# Patient Record
Sex: MALE | Race: WHITE | NOT HISPANIC OR LATINO | Employment: FULL TIME | URBAN - NONMETROPOLITAN AREA
[De-identification: names, ages, dates, MRNs, and addresses within clinical notes are randomized per-mention and may not be internally consistent; named-entity substitution may affect disease eponyms.]

---
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
## 2020-01-17 ENCOUNTER — TELEPHONE (OUTPATIENT)
Dept: PEDIATRICS | Facility: CLINIC | Age: 20
End: 2020-01-17

## 2020-01-17 RX ORDER — CEFPROZIL 500 MG/1
500 TABLET, FILM COATED ORAL 2 TIMES DAILY
Qty: 20 TABLET | Refills: 0 | Status: SHIPPED | OUTPATIENT
Start: 2020-01-17 | End: 2020-01-27

## 2020-11-20 ENCOUNTER — OFFICE VISIT (OUTPATIENT)
Dept: CARDIOLOGY | Facility: CLINIC | Age: 20
End: 2020-11-20

## 2020-11-20 VITALS
DIASTOLIC BLOOD PRESSURE: 78 MMHG | BODY MASS INDEX: 24.13 KG/M2 | OXYGEN SATURATION: 99 % | WEIGHT: 188 LBS | HEIGHT: 74 IN | HEART RATE: 58 BPM | SYSTOLIC BLOOD PRESSURE: 130 MMHG

## 2020-11-20 DIAGNOSIS — R06.09 DOE (DYSPNEA ON EXERTION): ICD-10-CM

## 2020-11-20 DIAGNOSIS — R00.2 PALPITATIONS: ICD-10-CM

## 2020-11-20 DIAGNOSIS — R00.1 BRADYCARDIA, SINUS: ICD-10-CM

## 2020-11-20 DIAGNOSIS — I47.1 PAROXYSMAL SVT (SUPRAVENTRICULAR TACHYCARDIA) (HCC): Primary | ICD-10-CM

## 2020-11-20 PROBLEM — I47.10 PAROXYSMAL SVT (SUPRAVENTRICULAR TACHYCARDIA): Status: ACTIVE | Noted: 2020-11-20

## 2020-11-20 PROCEDURE — 93000 ELECTROCARDIOGRAM COMPLETE: CPT | Performed by: INTERNAL MEDICINE

## 2020-11-20 PROCEDURE — 99204 OFFICE O/P NEW MOD 45 MIN: CPT | Performed by: INTERNAL MEDICINE

## 2020-11-20 NOTE — PROGRESS NOTES
Herb Kellogg  [id number]  2000  20 y.o.  male    Referring Provider: Curry Tsang MD    Reason for  Visit:  Initial visit for SVT  Diagnosed at age 4 years and started on beta blocker therapy       Subjective    Overall feeling well   No chest pain or shortness of breath when he is on  beta blocker therapy     No palpitations  No significant pedal edema    Compliant with medications and dietary advice  Good effort tolerance    No presyncope or syncope  Compliant with medications    Tolerating current medications well with no untoward side effects   Compliant with prescribed medication regimen. Tries to adhere to cardiac diet.    Good effort tolerance with no cardiovascular limitations and at the patient's baseline   If he does not take Atenolol  he gets dizzy, short of breath  and gets chest pain        History of present illness:  Herb Kellogg is a 20 y.o. yo male with history of SVT  who presents today for   Chief Complaint   Patient presents with   • Rapid Heart Rate     NEW PT    • Palpitations   .    History  Past Medical History:   Diagnosis Date   • SVT (supraventricular tachycardia) (CMS/HCC)    ,   Past Surgical History:   Procedure Laterality Date   • HERNIA REPAIR  2002   • TONSILLECTOMY  2004   • TYMPANOSTOMY TUBE PLACEMENT  2002   ,   Family History   Problem Relation Age of Onset   • Heart disease Other    ,   Social History     Tobacco Use   • Smoking status: Never Smoker   • Smokeless tobacco: Never Used   Substance Use Topics   • Alcohol use: No   • Drug use: Never   ,     Medications  Current Outpatient Medications   Medication Sig Dispense Refill   • atenolol (TENORMIN) 25 MG tablet Take 25 mg by mouth Daily.       No current facility-administered medications for this visit.        Allergies:  Patient has no known allergies.    Review of Systems  Review of Systems   Constitution: Negative.   HENT: Negative.    Eyes: Negative.    Cardiovascular: Negative for chest pain, claudication,  "cyanosis, dyspnea on exertion, irregular heartbeat, leg swelling, near-syncope, orthopnea, palpitations, paroxysmal nocturnal dyspnea and syncope.   Respiratory: Negative.    Endocrine: Negative.    Hematologic/Lymphatic: Negative.    Skin: Negative.    Gastrointestinal: Negative for anorexia.   Genitourinary: Negative.    Neurological: Negative.    Psychiatric/Behavioral: Negative.        Objective     Physical Exam:  /78   Pulse 58   Ht 188 cm (74\")   Wt 85.3 kg (188 lb)   SpO2 99%   BMI 24.14 kg/m²     Physical Exam  Constitutional:       Appearance: He is well-developed.   HENT:      Head: Normocephalic.   Neck:      Musculoskeletal: No edema.      Vascular: Normal carotid pulses. No carotid bruit or JVD.      Trachea: No tracheal tenderness or tracheal deviation.   Cardiovascular:      Rate and Rhythm: Regular rhythm.      Pulses: Normal pulses.      Heart sounds: Normal heart sounds.   Pulmonary:      Effort: Pulmonary effort is normal.      Breath sounds: No stridor.   Abdominal:      General: There is no distension.      Palpations: Abdomen is soft.      Tenderness: There is no abdominal tenderness.   Skin:     General: Skin is warm.   Neurological:      Mental Status: He is alert.      Cranial Nerves: No cranial nerve deficit.      Sensory: No sensory deficit.   Psychiatric:         Speech: Speech normal.         Behavior: Behavior normal.         Results Review:     ____________________________________________________________________________________________________________________________________________  Health maintenance and recommendations    Low salt/ HTN/ Heart healthy carbohydrate restricted cardiac diet   The patient is advised to reduce or avoid caffeine or other cardiac stimulants.   Minimize or avoid  NSAID-type medications      Monitor for any signs of bleeding including red or dark stools. Fall precautions.   Advised staying uptodate with immunizations per established standard " guidelines.    Offered to give patient  a copy of my notes     Questions were encouraged, asked and answered to the patient's  understanding and satisfaction. Questions if any regarding current medications and side effects, need for refills and importance of compliance to medications stressed.    Reviewed available prior notes, consults, prior visits, laboratory findings, radiology and cardiology relevant reports. Updated chart as applicable. I have reviewed the patient's medical history in detail and updated the computerized patient record as relevant.      Updated patient regarding any new or relevant abnormalities on review of records or any new findings on physical exam. Mentioned to patient about purpose of visit and desirable health short and long term goals and objectives.    Primary to monitor CBC CMP Lipid panel and TSH as applicable    ___________________________________________________________________________________________________________________________________________     ECG 12 Lead    Date/Time: 11/20/2020 12:57 PM  Performed by: Murray Rodriguez MD  Authorized by: Murray Rodriguez MD   Comparison: not compared with previous ECG   Rhythm: sinus bradycardia  Rate: bradycardic  Conduction: conduction normal  ST Segments: ST segments normal  T Waves: T waves normal  QRS axis: right  Other: no other findings    Clinical impression: abnormal EKG            Assessment/Plan   Diagnoses and all orders for this visit:    1. Paroxysmal SVT (supraventricular tachycardia) (CMS/HCC) (Primary)  -     Holter Monitor - 72 Hour Up To 21 Days; Future  -     Adult Transthoracic Echo Complete W/ Cont if Necessary Per Protocol; Future    2. Bradycardia, sinus    3. Palpitations    4. ROMAN (dyspnea on exertion)    Other orders  -     ECG 12 Lead          Plan    Orders Placed This Encounter   Procedures   • Holter Monitor - 72 Hour Up To 21 Days     3 days     Standing Status:   Future     Standing Expiration Date:   11/20/2021      Order Specific Question:   Reason for exam?     Answer:   Palpitations   • ECG 12 Lead     This order was created via procedure documentation   • Adult Transthoracic Echo Complete W/ Cont if Necessary Per Protocol     Myocardial strain to be performed during echocardiogram as long as technically feasible     Standing Status:   Future     Standing Expiration Date:   11/20/2021     Order Specific Question:   Reason for exam?     Answer:   Arrhythmia      The current medical regimen is effective;  continue present plan and medications.           Return in about 3 months (around 2/20/2021).

## 2021-01-13 ENCOUNTER — TELEPHONE (OUTPATIENT)
Dept: CARDIOLOGY | Facility: CLINIC | Age: 21
End: 2021-01-13

## 2021-01-13 NOTE — TELEPHONE ENCOUNTER
Pino with [employer] called needing to speak with you regarding this patient's Holter.  He can be reached directly at [phone].  Thank you!

## 2021-02-05 RX ORDER — ATENOLOL 25 MG/1
25 TABLET ORAL DAILY
Qty: 90 TABLET | Refills: 3 | Status: SHIPPED | OUTPATIENT
Start: 2021-02-05 | End: 2021-03-04 | Stop reason: SDUPTHER

## 2021-03-04 ENCOUNTER — OFFICE VISIT (OUTPATIENT)
Dept: CARDIOLOGY | Facility: CLINIC | Age: 21
End: 2021-03-04

## 2021-03-04 VITALS
BODY MASS INDEX: 23.74 KG/M2 | SYSTOLIC BLOOD PRESSURE: 102 MMHG | HEART RATE: 72 BPM | DIASTOLIC BLOOD PRESSURE: 72 MMHG | OXYGEN SATURATION: 98 % | WEIGHT: 185 LBS | HEIGHT: 74 IN

## 2021-03-04 DIAGNOSIS — I47.1 PAROXYSMAL SVT (SUPRAVENTRICULAR TACHYCARDIA) (HCC): ICD-10-CM

## 2021-03-04 DIAGNOSIS — R06.09 DOE (DYSPNEA ON EXERTION): Primary | ICD-10-CM

## 2021-03-04 DIAGNOSIS — R00.2 PALPITATIONS: ICD-10-CM

## 2021-03-04 DIAGNOSIS — R00.1 BRADYCARDIA, SINUS: ICD-10-CM

## 2021-03-04 PROCEDURE — 93000 ELECTROCARDIOGRAM COMPLETE: CPT | Performed by: INTERNAL MEDICINE

## 2021-03-04 PROCEDURE — 99213 OFFICE O/P EST LOW 20 MIN: CPT | Performed by: INTERNAL MEDICINE

## 2021-03-04 RX ORDER — ATENOLOL 25 MG/1
25 TABLET ORAL DAILY
Qty: 90 TABLET | Refills: 3 | Status: SHIPPED | OUTPATIENT
Start: 2021-03-04 | End: 2022-03-07 | Stop reason: SDUPTHER

## 2021-03-04 NOTE — PROGRESS NOTES
Herb Kellogg  [id number]  2000  20 y.o.  male    Referring Provider: Curry Tsang MD    Reason for  Visit:      Here for routine follow up   Initial visit for SVT  Diagnosed at age 4 years and started on beta blocker therapy   Cardiac workup test results as below : 14-day outpatient cardiac telemetry   Insurance for reason unclear did not approve Echo     Subjective      Overall feels the same   No new events or complaints since last visit   Overall the patient feels no major change from baseline symptoms   Similar symptoms as during last visit     Overall feeling well   No chest pain or shortness of breath when he is on  beta blocker therapy     No palpitations  No significant pedal edema    Compliant with medications and dietary advice  Good effort tolerance    No presyncope or syncope  Compliant with medications    Tolerating current medications well with no untoward side effects   Compliant with prescribed medication regimen. Tries to adhere to cardiac diet.  If he does not take Atenolol  he gets dizzy, short of breath  and gets chest pain     Excellent effort tolerance with no cardiovascular limitations and at the patient's baseline        History of present illness:  Herb Kellogg is a 20 y.o. yo male with history of SVT  who presents today for   Chief Complaint   Patient presents with   • Rapid Heart Rate     3 month follow up    .    History  Past Medical History:   Diagnosis Date   • SVT (supraventricular tachycardia) (CMS/HCC)    ,   Past Surgical History:   Procedure Laterality Date   • HERNIA REPAIR  2002   • TONSILLECTOMY  2004   • TYMPANOSTOMY TUBE PLACEMENT  2002   ,   Family History   Problem Relation Age of Onset   • Heart disease Other    ,   Social History     Tobacco Use   • Smoking status: Never Smoker   • Smokeless tobacco: Never Used   Substance Use Topics   • Alcohol use: No   • Drug use: Never   ,     Medications  Current Outpatient Medications   Medication Sig Dispense Refill  "  • atenolol (TENORMIN) 25 MG tablet Take 1 tablet by mouth Daily. 90 tablet 3     No current facility-administered medications for this visit.        Allergies:  Patient has no known allergies.    Review of Systems  Review of Systems   Constitution: Negative.   HENT: Negative.    Eyes: Negative.    Cardiovascular: Negative for chest pain, claudication, cyanosis, dyspnea on exertion, irregular heartbeat, leg swelling, near-syncope, orthopnea, palpitations, paroxysmal nocturnal dyspnea and syncope.   Respiratory: Negative.    Endocrine: Negative.    Hematologic/Lymphatic: Negative.    Skin: Negative.    Gastrointestinal: Negative for anorexia.   Genitourinary: Negative.    Neurological: Negative.    Psychiatric/Behavioral: Negative.        Objective     Physical Exam:  /72   Pulse 72   Ht 188 cm (74\")   Wt 83.9 kg (185 lb)   SpO2 98%   BMI 23.75 kg/m²     Physical Exam  Constitutional:       Appearance: He is well-developed.   HENT:      Head: Normocephalic.   Neck:      Musculoskeletal: No edema.      Vascular: Normal carotid pulses. No carotid bruit or JVD.      Trachea: No tracheal tenderness or tracheal deviation.   Cardiovascular:      Rate and Rhythm: Regular rhythm.      Pulses: Normal pulses.      Heart sounds: Normal heart sounds.   Pulmonary:      Effort: Pulmonary effort is normal.      Breath sounds: No stridor.   Abdominal:      General: There is no distension.      Palpations: Abdomen is soft.      Tenderness: There is no abdominal tenderness.   Skin:     General: Skin is warm.   Neurological:      Mental Status: He is alert.      Cranial Nerves: No cranial nerve deficit.      Sensory: No sensory deficit.   Psychiatric:         Speech: Speech normal.         Behavior: Behavior normal.         Results Review:      [employer]  Holter Monitor 72Hr-21Day (0296T/0298T)  Order# [id number]  Reading physician: Murray Rodriguez MD Ordering physician: Murray Rodriguez MD Study date: 11/20/20   Patient " Information    Patient Name   Herb Kellogg MRN   [id number] Sex   Male  (Age)   2000 (20 y.o.)   Interpretation Summary       · Average HR: 69. Min HR: 25. Max HR: 135     · Entire report was reviewed.  Monitoring in days: ~3   · The predominant rhythm noted during the testing period was sinus rhythm.     · Rare supraventricular ectopics with an APC burden of: < 1%    · Rare premature ventricular contractions with a PVC burden of: < 1%     · No correlated arrhythmia  · No significant pauses                  Conclusion:      Baseline rhythm is sinus.  No significant ectopy   Very transient Mobitz type I AV block with lowest heart rate recorded of 24 bpm this occurred at 3:23 AM presumably during sleep  This was over only over 2 beats  While in sinus rhythm lowest heart rate of 44 bpm at 12:22 AM consisting of marked sinus bradycardia likely once again during sleep  No daytime bradycardia, pauses or any arrhythmia noted.                 ____________________________________________________________________________________________________________________________________________  Health maintenance and recommendations    Low salt/ HTN/ Heart healthy carbohydrate restricted cardiac diet   The patient is advised to reduce or avoid caffeine or other cardiac stimulants.   Minimize or avoid  NSAID-type medications      Monitor for any signs of bleeding including red or dark stools. Fall precautions.   Advised staying uptodate with immunizations per established standard guidelines.    Offered to give patient  a copy of my notes     Questions were encouraged, asked and answered to the patient's  understanding and satisfaction. Questions if any regarding current medications and side effects, need for refills and importance of compliance to medications stressed.    Reviewed available prior notes, consults, prior visits, laboratory findings, radiology and cardiology relevant reports. Updated chart as applicable. I have  reviewed the patient's medical history in detail and updated the computerized patient record as relevant.      Updated patient regarding any new or relevant abnormalities on review of records or any new findings on physical exam. Mentioned to patient about purpose of visit and desirable health short and long term goals and objectives.    Primary to monitor CBC CMP Lipid panel and TSH as applicable    ___________________________________________________________________________________________________________________________________________     ECG 12 Lead    Date/Time: 3/4/2021 9:17 AM  Performed by: Murray Rodriguez MD  Authorized by: Murray Rodriguez MD   Comparison: compared with previous ECG from 11/20/2020  Similar to previous ECG  Comparison to previous ECG: Repolarization changes  Rhythm: sinus rhythm  Rate: normal  Conduction: conduction normal  ST Segments: ST segments normal  T Waves: T waves normal  QRS axis: normal  Other: no other findings    Clinical impression: normal ECG            Assessment/Plan   Diagnoses and all orders for this visit:    1. ROMAN (dyspnea on exertion) (Primary)    2. Paroxysmal SVT (supraventricular tachycardia) (CMS/HCC)    3. Bradycardia, sinus    4. Palpitations    Other orders  -     ECG 12 Lead  -     atenolol (TENORMIN) 25 MG tablet; Take 1 tablet by mouth Daily.  Dispense: 90 tablet; Refill: 3          Plan      Patient expressed understanding  Encouraged and answered all questions   Discussed with the patient and all questioned fully answered. He will call me if any problems arise.   Discussed results of prior testing with patient: 14-day outpatient cardiac telemetry  electrocardiogram from today      Avoid cardiac stimulants such as pseudoephedrine, caffeine,chocolates, nicotine etc   Check BP and heart rates twice daily at least 3x / week, week a month  at home and bring a recording for me to review next visit  If BP >130/85 or < 100/60 persistently over 3 reading 30 mins apart  call sooner        Orders Placed This Encounter   Procedures   • ECG 12 Lead     This order was created via procedure documentation      The current medical regimen is effective;  continue present plan and medications.   Requested Prescriptions     Signed Prescriptions Disp Refills   • atenolol (TENORMIN) 25 MG tablet 90 tablet 3     Sig: Take 1 tablet by mouth Daily.      Patient undecided regarding the Covid vaccine   Urged to consider vaccination further   I can provide more input if required   Recommend further discussion with primary care provider     Follow up with [employer] to address interim issues           Return in about 1 year (around 3/4/2022).

## 2022-03-03 PROBLEM — I47.10 PAROXYSMAL SVT (SUPRAVENTRICULAR TACHYCARDIA): Chronic | Status: ACTIVE | Noted: 2020-11-20

## 2022-03-03 PROBLEM — T50.905A BRADYCARDIA, DRUG INDUCED: Chronic | Status: ACTIVE | Noted: 2020-11-20

## 2022-03-03 PROBLEM — R00.1 BRADYCARDIA, DRUG INDUCED: Chronic | Status: ACTIVE | Noted: 2020-11-20

## 2022-03-03 PROBLEM — I47.1 PAROXYSMAL SVT (SUPRAVENTRICULAR TACHYCARDIA) (HCC): Chronic | Status: ACTIVE | Noted: 2020-11-20

## 2022-03-03 PROBLEM — T50.905A BRADYCARDIA, DRUG INDUCED: Status: ACTIVE | Noted: 2020-11-20

## 2022-03-04 NOTE — PROGRESS NOTES
"Chief Complaint  Rapid Heart Rate (1yr F/U. Has been well. No chest pain, palpitations, SOA, or edema. No feelings of fast HR's)    Subjective          Herb Kellogg presents to [facility] for routine follow-up.  He has paroxysmal supraventricular tachycardia diagnosed at age 4 at which time he was started on beta-blocker therapy.  He has drug-induced bradycardia, but denies any symptoms related to this.  He reports that when he does not take atenolol he has palpitations, chest pain, shortness of breath and dizziness.  However, he denies any of these symptoms when taking atenolol.  Patient denies syncope, orthopnea, PND, edema or decreased stamina.  Patient denies any signs of bleeding.    Palpitations   This is a chronic problem. The current episode started more than 1 year ago. The problem occurs rarely. The problem has been unchanged. Pertinent negatives include no anxiety, chest fullness, chest pain, coughing, diaphoresis, dizziness, fever, irregular heartbeat, malaise/fatigue, nausea, near-syncope, numbness, shortness of breath, syncope, vomiting or weakness. He has tried beta blockers for the symptoms. The treatment provided significant relief. Risk factors include being male.       Objective   Vital Signs:   /86   Pulse 50   Ht 188 cm (74\")   Wt 88.9 kg (196 lb)   BMI 25.16 kg/m²     Vitals and nursing note reviewed.   Constitutional:       General: Awake.      Appearance: Normal and healthy appearance. Well-developed, normal weight and not in distress.   Eyes:      General: Lids are normal.      Conjunctiva/sclera: Conjunctivae normal.      Pupils: Pupils are equal, round, and reactive to light.   HENT:      Head: Normocephalic and atraumatic.      Nose: Nose normal.   Neck:      Vascular: No JVR. JVD normal.   Pulmonary:      Effort: Pulmonary effort is normal.      Breath sounds: Normal breath sounds. No wheezing. No rhonchi. No rales.   Chest:      Chest wall: Not " tender to palpatation.   Cardiovascular:      PMI at left midclavicular line. Bradycardia present. Regular rhythm. Normal S1. Normal S2.      Murmurs: There is no murmur.      No gallop. No click. No rub.   Pulses:     Intact distal pulses.   Edema:     Peripheral edema absent.   Abdominal:      General: Bowel sounds are normal.      Palpations: Abdomen is soft.      Tenderness: There is no abdominal tenderness.   Musculoskeletal: Normal range of motion.         General: No tenderness.      Cervical back: Normal range of motion. Skin:     General: Skin is warm and dry.   Neurological:      General: No focal deficit present.      Mental Status: Alert, oriented to person, place, and time and oriented to person, place and time.   Psychiatric:         Attention and Perception: Attention and perception normal.         Mood and Affect: Mood and affect normal.         Speech: Speech normal.         Behavior: Behavior normal. Behavior is cooperative.         Thought Content: Thought content normal.         Cognition and Memory: Cognition and memory normal.         Judgment: Judgment normal.        Result Review :   The following data was reviewed by: ELEAZAR Pena on 03/04/2022:      Data reviewed: Cardiology studies holter monitor 12/11/20     ECG 12 Lead    Date/Time: 3/7/2022 9:48 AM  Performed by: Destiney Mohr APRN  Authorized by: Destiney Mohr APRN   Comparison: compared with previous ECG from 3/4/2021  Rhythm: sinus bradycardia and sinus arrhythmia  Rate: bradycardic  BPM: 50  Q waves: III and aVF      Clinical impression: abnormal EKG              Assessment and Plan    Diagnoses and all orders for this visit:    1. Paroxysmal SVT (supraventricular tachycardia) (HCC) (Primary)-asymptomatic when taking atenolol.  Continue atenolol.    2. Bradycardia, drug induced-asymptomatic.  Continue atenolol.    3. Abnormal EKG- new inferior q waves with borderline LVH and sinus arrhythmia. Check 2d echo.          Follow Up   Return in about 1 year (around 3/7/2023) for Next scheduled follow up with Dr. Rodriguez.  Patient was given instructions and counseling regarding his condition or for health maintenance advice. Please see specific information pulled into the AVS if appropriate.

## 2022-03-07 ENCOUNTER — OFFICE VISIT (OUTPATIENT)
Dept: CARDIOLOGY | Facility: CLINIC | Age: 22
End: 2022-03-07

## 2022-03-07 VITALS
BODY MASS INDEX: 25.15 KG/M2 | HEART RATE: 50 BPM | SYSTOLIC BLOOD PRESSURE: 128 MMHG | DIASTOLIC BLOOD PRESSURE: 86 MMHG | WEIGHT: 196 LBS | HEIGHT: 74 IN

## 2022-03-07 DIAGNOSIS — I47.1 PAROXYSMAL SVT (SUPRAVENTRICULAR TACHYCARDIA): Primary | Chronic | ICD-10-CM

## 2022-03-07 DIAGNOSIS — R00.1 BRADYCARDIA, DRUG INDUCED: Chronic | ICD-10-CM

## 2022-03-07 DIAGNOSIS — R94.31 ABNORMAL EKG: ICD-10-CM

## 2022-03-07 DIAGNOSIS — T50.905A BRADYCARDIA, DRUG INDUCED: Chronic | ICD-10-CM

## 2022-03-07 PROCEDURE — 93000 ELECTROCARDIOGRAM COMPLETE: CPT | Performed by: NURSE PRACTITIONER

## 2022-03-07 PROCEDURE — 99214 OFFICE O/P EST MOD 30 MIN: CPT | Performed by: NURSE PRACTITIONER

## 2022-03-07 RX ORDER — ATENOLOL 25 MG/1
25 TABLET ORAL DAILY
Qty: 90 TABLET | Refills: 3 | Status: SHIPPED | OUTPATIENT
Start: 2022-03-07

## 2022-03-07 NOTE — ADDENDUM NOTE
Addended by: TEE BEASLEY on: 3/7/2022 11:32 AM     Modules accepted: Level of Service     URI (upper respiratory infection)

## 2022-04-12 ENCOUNTER — HOSPITAL ENCOUNTER (OUTPATIENT)
Dept: CARDIOLOGY | Facility: HOSPITAL | Age: 22
Discharge: HOME | End: 2022-04-12
Admitting: NURSE PRACTITIONER

## 2022-04-12 VITALS
SYSTOLIC BLOOD PRESSURE: 131 MMHG | DIASTOLIC BLOOD PRESSURE: 71 MMHG | BODY MASS INDEX: 25.15 KG/M2 | HEIGHT: 74 IN | WEIGHT: 196 LBS

## 2022-04-12 DIAGNOSIS — I47.1 PAROXYSMAL SVT (SUPRAVENTRICULAR TACHYCARDIA): ICD-10-CM

## 2022-04-12 DIAGNOSIS — T50.905A BRADYCARDIA, DRUG INDUCED: ICD-10-CM

## 2022-04-12 DIAGNOSIS — R00.1 BRADYCARDIA, DRUG INDUCED: ICD-10-CM

## 2022-04-12 DIAGNOSIS — R94.31 ABNORMAL EKG: ICD-10-CM

## 2022-04-12 PROCEDURE — 93306 TTE W/DOPPLER COMPLETE: CPT | Performed by: INTERNAL MEDICINE

## 2022-04-12 PROCEDURE — 93306 TTE W/DOPPLER COMPLETE: CPT

## 2022-04-12 PROCEDURE — 93356 MYOCRD STRAIN IMG SPCKL TRCK: CPT

## 2022-04-12 PROCEDURE — 93356 MYOCRD STRAIN IMG SPCKL TRCK: CPT | Performed by: INTERNAL MEDICINE

## 2022-04-13 LAB
BH CV ECHO LEFT VENTRICLE GLOBAL LONGITUDINAL STRAIN: -15 %
BH CV ECHO MEAS - AO MAX PG: 8.8 MMHG
BH CV ECHO MEAS - AO MEAN PG: 5 MMHG
BH CV ECHO MEAS - AO ROOT DIAM: 2.8 CM
BH CV ECHO MEAS - AO V2 MAX: 148 CM/SEC
BH CV ECHO MEAS - AO V2 VTI: 33.1 CM
BH CV ECHO MEAS - AVA(I,D): 4.3 CM2
BH CV ECHO MEAS - EDV(CUBED): 122.8 ML
BH CV ECHO MEAS - EDV(MOD-SP4): 107 ML
BH CV ECHO MEAS - EF(MOD-SP4): 67.1 %
BH CV ECHO MEAS - ESV(CUBED): 35.3 ML
BH CV ECHO MEAS - ESV(MOD-SP4): 35.2 ML
BH CV ECHO MEAS - FS: 34 %
BH CV ECHO MEAS - IVS/LVPW: 0.79 CM
BH CV ECHO MEAS - IVSD: 0.85 CM
BH CV ECHO MEAS - LA DIMENSION: 2.8 CM
BH CV ECHO MEAS - LAT PEAK E' VEL: 19.6 CM/SEC
BH CV ECHO MEAS - LV DIASTOLIC VOL/BSA (35-75): 49.7 CM2
BH CV ECHO MEAS - LV MASS(C)D: 170 GRAMS
BH CV ECHO MEAS - LV MAX PG: 6.8 MMHG
BH CV ECHO MEAS - LV MEAN PG: 4 MMHG
BH CV ECHO MEAS - LV SYSTOLIC VOL/BSA (12-30): 16.3 CM2
BH CV ECHO MEAS - LV V1 MAX: 130 CM/SEC
BH CV ECHO MEAS - LV V1 VTI: 29.3 CM
BH CV ECHO MEAS - LVIDD: 5 CM
BH CV ECHO MEAS - LVIDS: 3.3 CM
BH CV ECHO MEAS - LVOT AREA: 4.9 CM2
BH CV ECHO MEAS - LVOT DIAM: 2.5 CM
BH CV ECHO MEAS - LVPWD: 1.07 CM
BH CV ECHO MEAS - MED PEAK E' VEL: 17 CM/SEC
BH CV ECHO MEAS - MV A MAX VEL: 30.6 CM/SEC
BH CV ECHO MEAS - MV DEC TIME: 0.23 MSEC
BH CV ECHO MEAS - MV E MAX VEL: 82.5 CM/SEC
BH CV ECHO MEAS - MV E/A: 2.7
BH CV ECHO MEAS - PA V2 MAX: 91.7 CM/SEC
BH CV ECHO MEAS - RAP SYSTOLE: 5 MMHG
BH CV ECHO MEAS - RVSP: 29.2 MMHG
BH CV ECHO MEAS - SI(MOD-SP4): 33.3 ML/M2
BH CV ECHO MEAS - SV(LVOT): 143.8 ML
BH CV ECHO MEAS - SV(MOD-SP4): 71.8 ML
BH CV ECHO MEAS - TR MAX PG: 24.2 MMHG
BH CV ECHO MEAS - TR MAX VEL: 246 CM/SEC
BH CV ECHO MEASUREMENTS AVERAGE E/E' RATIO: 4.51
LEFT ATRIUM VOLUME INDEX: 24.1 ML/M2
LEFT ATRIUM VOLUME: 51.9 CM3
MAXIMAL PREDICTED HEART RATE: 199 BPM
STRESS TARGET HR: 169 BPM

## 2022-05-19 ENCOUNTER — TRANSCRIBE ORDERS (OUTPATIENT)
Dept: ADMINISTRATIVE | Facility: HOSPITAL | Age: 22
End: 2022-05-19

## 2022-05-19 DIAGNOSIS — Z31.41 ENCOUNTER FOR SPERM COUNT FOR FERTILITY TESTING: Primary | ICD-10-CM

## 2022-12-13 ENCOUNTER — OFFICE VISIT (OUTPATIENT)
Dept: CARDIOLOGY | Facility: CLINIC | Age: 22
End: 2022-12-13

## 2022-12-13 ENCOUNTER — DOCUMENTATION (OUTPATIENT)
Dept: CARDIOLOGY | Facility: CLINIC | Age: 22
End: 2022-12-13

## 2022-12-13 ENCOUNTER — TELEPHONE (OUTPATIENT)
Dept: CARDIOLOGY | Facility: CLINIC | Age: 22
End: 2022-12-13

## 2022-12-13 VITALS
DIASTOLIC BLOOD PRESSURE: 75 MMHG | WEIGHT: 187 LBS | BODY MASS INDEX: 24 KG/M2 | RESPIRATION RATE: 18 BRPM | HEART RATE: 74 BPM | HEIGHT: 74 IN | SYSTOLIC BLOOD PRESSURE: 130 MMHG | OXYGEN SATURATION: 98 %

## 2022-12-13 DIAGNOSIS — R00.1 BRADYCARDIA, DRUG INDUCED: ICD-10-CM

## 2022-12-13 DIAGNOSIS — T50.905A BRADYCARDIA, DRUG INDUCED: ICD-10-CM

## 2022-12-13 DIAGNOSIS — R07.89 CHEST PAIN, ATYPICAL: ICD-10-CM

## 2022-12-13 DIAGNOSIS — I47.1 PAROXYSMAL SVT (SUPRAVENTRICULAR TACHYCARDIA): Primary | ICD-10-CM

## 2022-12-13 PROCEDURE — 99214 OFFICE O/P EST MOD 30 MIN: CPT | Performed by: NURSE PRACTITIONER

## 2022-12-13 NOTE — PROGRESS NOTES
Subjective:     Encounter Date: 12/13/2022      Patient ID: Herb Kellogg is a 22 y.o. male with paroxysmal supraventricular tachycardia    Chief Complaint: heart racing  History of Present Illness  Patient presents today for management of SVT. He has been on atenolol since diagnosed at age 4. He had an echo done 4/12/2022 that showed normal LVEF 61-65%, normal LV diastolic function, and no significant valvular disease.   Today he reports that he had an episode last week. He reports that it is the first episode he has had in a year. He reports that he was sitting in the floor and he started having heart racing. He states that heart rate was up to 160 bpm. He states that episode lasted about 15-20 minutes. It resolved on its own very abruptly. He reports that he was dizzy and felt near syncopal at that time. He reports that over the last couple of weeks he has noted some intermittent discomfort like someone is squeezing his heart. He reports that it occurs randomly. He reports that it is a tightness. He reports that if he moves positions it is relieved and usually resolves on its own. He denies monitoring his blood pressure. He states that it was high when he had his episode last week. He denies any leg swelling, orthopnea or PND. He denies any normal exercise. He follows with Dr Valdez as PCP.     The following portions of the patient's history were reviewed and updated as appropriate: allergies, current medications, past family history, past medical history, past social history, past surgical history and problem list.    No Known Allergies    Current Outpatient Medications:   •  atenolol (TENORMIN) 25 MG tablet, Take 1 tablet by mouth Daily., Disp: 90 tablet, Rfl: 3  Past Medical History:   Diagnosis Date   • Bradycardia, drug induced 11/20/2020   • Paroxysmal SVT (supraventricular tachycardia) (HCC) 11/20/2020     Social History     Socioeconomic History   • Marital status: Single   Tobacco Use   • Smoking  status: Never   • Smokeless tobacco: Never   Vaping Use   • Vaping Use: Never used   Substance and Sexual Activity   • Alcohol use: No   • Drug use: Never   • Sexual activity: Defer       Review of Systems   Constitutional: Negative for malaise/fatigue.   HENT: Negative for nosebleeds.    Cardiovascular: Negative for chest pain, dyspnea on exertion, irregular heartbeat, leg swelling, near-syncope, orthopnea, palpitations, paroxysmal nocturnal dyspnea and syncope.   Respiratory: Negative for shortness of breath.    Hematologic/Lymphatic: Does not bruise/bleed easily.   Genitourinary: Negative for hematuria.   Neurological: Negative for dizziness and weakness.   All other systems reviewed and are negative.         Objective:     Vitals reviewed.   Constitutional:       General: Not in acute distress.     Appearance: Normal appearance. Well-developed.   Eyes:      Pupils: Pupils are equal, round, and reactive to light.   HENT:      Head: Normocephalic and atraumatic.      Nose: Nose normal.   Neck:      Vascular: No carotid bruit.   Pulmonary:      Effort: Pulmonary effort is normal. No respiratory distress.      Breath sounds: Normal breath sounds. No wheezing. No rales.   Cardiovascular:      Normal rate. Regular rhythm.      Murmurs: There is no murmur.   Edema:     Peripheral edema absent.   Abdominal:      General: There is no distension.      Palpations: Abdomen is soft.   Musculoskeletal: Normal range of motion.      Cervical back: Normal range of motion and neck supple. Skin:     General: Skin is warm.      Findings: No erythema or rash.   Neurological:      General: No focal deficit present.      Mental Status: Alert and oriented to person, place, and time.   Psychiatric:         Attention and Perception: Attention normal.         Mood and Affect: Mood normal.         Speech: Speech normal.         Behavior: Behavior normal.         Thought Content: Thought content normal.         Judgment: Judgment normal.  "        /75 (BP Location: Right arm, Patient Position: Sitting, Cuff Size: Adult)   Pulse 74   Resp 18   Ht 188 cm (74\")   Wt 84.8 kg (187 lb)   SpO2 98%   BMI 24.01 kg/m²     Procedures    Lab Review:     Results for orders placed during the hospital encounter of 04/12/22    Adult Transthoracic Echo Complete w/ Color, Spectral and Contrast if necessary per protocol    Interpretation Summary  · Left ventricular ejection fraction appears to be 61 - 65%. Left ventricular systolic function is normal.  · Left ventricular diastolic function was normal.  · Abnormal global longitudinal LV strain (GLS) = -15.0%.  · Estimated right ventricular systolic pressure from tricuspid regurgitation is normal (<35 mmHg).    I have personally reviewed echo and past office notes prior to patients visit  Assessment:          Diagnosis Plan   1. Paroxysmal SVT (supraventricular tachycardia) (HCC)  Ambulatory Referral to Cardiac Electrophysiology      2. Bradycardia, drug induced        3. Chest pain, atypical  Adult Stress Echo W/ Cont or Stress Agent if Necessary Per Protocol             Plan:       1. Paroxysmal SVT: Discussed EP referral with patient to make sure that atenolol is the best management of SVT. Discussed a holter monitor if symptoms become more frequent. Continue atenolol.     2. Bradycardia: Drug induced. Heart rate is normally 50-60's. Patient has been asymptomatic to this. Will continue to monitor    3. Atypical chest pain: discussed atypical symptoms of chest pain. Will order stress echo for further evaluation     Patient is to follow up in 6 months or sooner if needed     "

## 2022-12-13 NOTE — TELEPHONE ENCOUNTER
Caller: Herb Kellogg    Relationship to patient: Self    Best call back number:[phone]     Patient is needing: PT WAS IN OFFICE EARLIER TODAY AND IS NEEDING A WORK EXCUSE. HE IS WANTING TO KNOW IF IT COULD BE EMAILED TO HIM.    [email or web address]

## 2022-12-20 ENCOUNTER — HOSPITAL ENCOUNTER (OUTPATIENT)
Dept: CARDIOLOGY | Facility: HOSPITAL | Age: 22
Discharge: HOME | End: 2022-12-20
Admitting: NURSE PRACTITIONER

## 2022-12-20 VITALS
BODY MASS INDEX: 23.74 KG/M2 | WEIGHT: 185 LBS | HEART RATE: 70 BPM | DIASTOLIC BLOOD PRESSURE: 78 MMHG | HEIGHT: 74 IN | SYSTOLIC BLOOD PRESSURE: 158 MMHG

## 2022-12-20 DIAGNOSIS — R07.89 CHEST PAIN, ATYPICAL: ICD-10-CM

## 2022-12-20 PROCEDURE — 93017 CV STRESS TEST TRACING ONLY: CPT

## 2022-12-20 PROCEDURE — 93018 CV STRESS TEST I&R ONLY: CPT | Performed by: INTERNAL MEDICINE

## 2022-12-20 PROCEDURE — 93350 STRESS TTE ONLY: CPT | Performed by: INTERNAL MEDICINE

## 2022-12-20 PROCEDURE — 93350 STRESS TTE ONLY: CPT

## 2022-12-22 ENCOUNTER — TELEPHONE (OUTPATIENT)
Dept: CARDIOLOGY | Facility: CLINIC | Age: 22
End: 2022-12-22

## 2022-12-22 LAB
BH CV STRESS BP STAGE 1: NORMAL
BH CV STRESS BP STAGE 2: NORMAL
BH CV STRESS BP STAGE 3: NORMAL
BH CV STRESS DURATION MIN STAGE 1: 3
BH CV STRESS DURATION MIN STAGE 2: 3
BH CV STRESS DURATION MIN STAGE 3: 3
BH CV STRESS DURATION SEC STAGE 1: 0
BH CV STRESS DURATION SEC STAGE 2: 0
BH CV STRESS DURATION SEC STAGE 3: 0
BH CV STRESS ECHO POST STRESS EJECTION FRACTION EF: 65 %
BH CV STRESS GRADE STAGE 1: 10
BH CV STRESS GRADE STAGE 2: 12
BH CV STRESS GRADE STAGE 3: 14
BH CV STRESS HR STAGE 1: 127
BH CV STRESS HR STAGE 2: 155
BH CV STRESS HR STAGE 3: 173
BH CV STRESS METS STAGE 1: 5
BH CV STRESS METS STAGE 2: 7.5
BH CV STRESS METS STAGE 3: 10
BH CV STRESS PROTOCOL 1: NORMAL
BH CV STRESS RECOVERY BP: NORMAL MMHG
BH CV STRESS RECOVERY HR: 100 BPM
BH CV STRESS SPEED STAGE 1: 1.7
BH CV STRESS SPEED STAGE 2: 2.5
BH CV STRESS SPEED STAGE 3: 3.4
BH CV STRESS STAGE 1: 1
BH CV STRESS STAGE 2: 2
BH CV STRESS STAGE 3: 3
MAXIMAL PREDICTED HEART RATE: 198 BPM
PERCENT MAX PREDICTED HR: 87.37 %
STRESS BASELINE BP: NORMAL MMHG
STRESS BASELINE HR: 69 BPM
STRESS PERCENT HR: 103 %
STRESS POST ESTIMATED WORKLOAD: 10 METS
STRESS POST EXERCISE DUR MIN: 9 MIN
STRESS POST EXERCISE DUR SEC: 0 SEC
STRESS POST PEAK BP: NORMAL MMHG
STRESS POST PEAK HR: 173 BPM
STRESS TARGET HR: 168 BPM

## 2022-12-22 NOTE — TELEPHONE ENCOUNTER
Anushka ELDRIDGE called and left a vm to pt about the stress test result. He called stated that he did not understand what she said. I read him the result. He called again and has more questions about his result. Please call him at [phone]

## 2023-01-26 NOTE — PROGRESS NOTES
"Chief Complaint  Rapid Heart Rate (NP - Echo 12/2022)    Subjective        History of Present Illness    EP Problems:  1.  Sustained SVT  2.  Drug-induced sinus bradycardia    Herb Kellogg is a 22 y.o. male with problem list as above who presents to the clinic for evaluation of sustained SVT.  He has a history of SVT dating back to when he was 4 years old.  He has previously been treated with atenolol and has done reasonably well with this over a long period of time.  More recently, he has had increased episodes that last for approximately 15 to 20 minutes at a time causing near syncope with heart rates in the 160s.  There are no clear triggers for these episodes.  Sometimes the episodes seem to and with positional changes.  He endorses only 1-2 episodes in the past 3 years.  He does feel a pounding sensation in his head when this occurs.    Objective   Vital Signs:  /64 (BP Location: Right arm, Patient Position: Sitting)   Pulse 57   Resp 18   Ht 188 cm (74\")   Wt 86.6 kg (191 lb)   SpO2 98%   BMI 24.52 kg/m²   Estimated body mass index is 24.52 kg/m² as calculated from the following:    Height as of this encounter: 188 cm (74\").    Weight as of this encounter: 86.6 kg (191 lb).      Physical Exam  Vitals reviewed.   Constitutional:       Appearance: Normal appearance.   HENT:      Head: Normocephalic and atraumatic.   Eyes:      Extraocular Movements: Extraocular movements intact.      Conjunctiva/sclera: Conjunctivae normal.   Cardiovascular:      Rate and Rhythm: Normal rate and regular rhythm.      Pulses: Normal pulses.      Heart sounds: Normal heart sounds.   Pulmonary:      Effort: Pulmonary effort is normal.      Breath sounds: Normal breath sounds.   Musculoskeletal:         General: No swelling.   Neurological:      General: No focal deficit present.      Mental Status: He is alert and oriented to person, place, and time.   Psychiatric:         Mood and Affect: Mood normal.         " Judgment: Judgment normal.        Result Review :  The following data was reviewed by: Ora Hoyos MD on 01/27/2023:    Prior ECG previously performed by Destiney Mohr on 3/7/2022 was directly visualized and independently interpreted with the following findings: Normal sinus rhythm      ECG 12 Lead    Date/Time: 1/27/2023 4:31 PM  Performed by: Ora Hoyos MD  Authorized by: Ora Hoyos MD   Comparison: compared with previous ECG from 3/7/2022  Similar to previous ECG  Rhythm: sinus rhythm  Rate: normal  Conduction: conduction normal  QRS axis: normal  Other: no other findings    Clinical impression: normal ECG                Assessment and Plan   Diagnoses and all orders for this visit:    1. Paroxysmal SVT (supraventricular tachycardia) (HCC) (Primary)  -     Basic Metabolic Panel  -     CBC (No Diff)  -     Thyroid Panel With TSH  -     Magnesium    2. Bradycardia, drug induced  -     Basic Metabolic Panel  -     CBC (No Diff)  -     Thyroid Panel With TSH  -     Magnesium        Herb Kellogg is a 22 y.o. male with problem list as above who presents to the clinic for evaluation of paroxysmal SVT.  He is still having occasional episodes despite BB use.  Given this, we discussed the risks-benefits of an ablation to potentially decrease or cure these arrhythmias.  At this time, he feels like the episodes are well controlled enough that he does not want to undergo the risk of an ablation to treat this more definitively.  This is a reasonable decision.  Given this, we will continue medical therapy for now and he was advised to contact me should he change his mind.  We reviewed vagal maneuvers to terminate these episodes should it become necessary.    Plan:  - No ablation at this time given patient preference  - No medication changes for now       Follow Up   Return in about 6 months (around 7/27/2023).  Patient was given instructions and counseling regarding his condition or for health maintenance advice.  Please see specific information pulled into the AVS if appropriate.     Part of this note may be an electronic transcription/translation of spoken language to printed text using the Dragon Dictation System.

## 2023-01-27 ENCOUNTER — OFFICE VISIT (OUTPATIENT)
Dept: CARDIOLOGY | Facility: CLINIC | Age: 23
End: 2023-01-27
Payer: COMMERCIAL

## 2023-01-27 VITALS
HEIGHT: 74 IN | RESPIRATION RATE: 18 BRPM | WEIGHT: 191 LBS | SYSTOLIC BLOOD PRESSURE: 110 MMHG | HEART RATE: 57 BPM | BODY MASS INDEX: 24.51 KG/M2 | DIASTOLIC BLOOD PRESSURE: 64 MMHG | OXYGEN SATURATION: 98 %

## 2023-01-27 DIAGNOSIS — R00.1 BRADYCARDIA, DRUG INDUCED: Chronic | ICD-10-CM

## 2023-01-27 DIAGNOSIS — I47.1 PAROXYSMAL SVT (SUPRAVENTRICULAR TACHYCARDIA): Primary | Chronic | ICD-10-CM

## 2023-01-27 DIAGNOSIS — T50.905A BRADYCARDIA, DRUG INDUCED: Chronic | ICD-10-CM

## 2023-01-27 PROCEDURE — 93000 ELECTROCARDIOGRAM COMPLETE: CPT | Performed by: STUDENT IN AN ORGANIZED HEALTH CARE EDUCATION/TRAINING PROGRAM

## 2023-01-27 PROCEDURE — 99204 OFFICE O/P NEW MOD 45 MIN: CPT | Performed by: STUDENT IN AN ORGANIZED HEALTH CARE EDUCATION/TRAINING PROGRAM

## 2023-04-14 RX ORDER — ATENOLOL 25 MG/1
25 TABLET ORAL DAILY
Qty: 90 TABLET | Refills: 3 | Status: SHIPPED | OUTPATIENT
Start: 2023-04-14

## 2023-11-11 ENCOUNTER — APPOINTMENT (OUTPATIENT)
Dept: GENERAL RADIOLOGY | Facility: HOSPITAL | Age: 23
End: 2023-11-11
Payer: COMMERCIAL

## 2023-11-11 ENCOUNTER — HOSPITAL ENCOUNTER (EMERGENCY)
Facility: HOSPITAL | Age: 23
Discharge: HOME | End: 2023-11-12
Payer: COMMERCIAL

## 2023-11-11 DIAGNOSIS — R00.0 RAPID HEART RATE: ICD-10-CM

## 2023-11-11 DIAGNOSIS — R20.2 PARESTHESIA: Primary | ICD-10-CM

## 2023-11-11 LAB
ALBUMIN SERPL-MCNC: 5 G/DL (ref 3.5–5.2)
ALBUMIN/GLOB SERPL: 1.9 G/DL
ALP SERPL-CCNC: 88 U/L (ref 39–117)
ALT SERPL W P-5'-P-CCNC: 17 U/L (ref 1–41)
AMPHET+METHAMPHET UR QL: NEGATIVE
AMPHETAMINES UR QL: NEGATIVE
ANION GAP SERPL CALCULATED.3IONS-SCNC: 12 MMOL/L (ref 5–15)
AST SERPL-CCNC: 16 U/L (ref 1–40)
BARBITURATES UR QL SCN: NEGATIVE
BASOPHILS # BLD AUTO: 0.07 10*3/MM3 (ref 0–0.2)
BASOPHILS NFR BLD AUTO: 0.7 % (ref 0–1.5)
BENZODIAZ UR QL SCN: NEGATIVE
BILIRUB SERPL-MCNC: 0.9 MG/DL (ref 0–1.2)
BUN SERPL-MCNC: 13 MG/DL (ref 6–20)
BUN/CREAT SERPL: 15.5 (ref 7–25)
BUPRENORPHINE SERPL-MCNC: NEGATIVE NG/ML
CALCIUM SPEC-SCNC: 9.7 MG/DL (ref 8.6–10.5)
CANNABINOIDS SERPL QL: NEGATIVE
CHLORIDE SERPL-SCNC: 103 MMOL/L (ref 98–107)
CO2 SERPL-SCNC: 26 MMOL/L (ref 22–29)
COCAINE UR QL: NEGATIVE
CREAT SERPL-MCNC: 0.84 MG/DL (ref 0.76–1.27)
DEPRECATED RDW RBC AUTO: 37.1 FL (ref 37–54)
EGFRCR SERPLBLD CKD-EPI 2021: 126.4 ML/MIN/1.73
EOSINOPHIL # BLD AUTO: 0.07 10*3/MM3 (ref 0–0.4)
EOSINOPHIL NFR BLD AUTO: 0.7 % (ref 0.3–6.2)
ERYTHROCYTE [DISTWIDTH] IN BLOOD BY AUTOMATED COUNT: 11.7 % (ref 12.3–15.4)
FENTANYL UR-MCNC: NEGATIVE NG/ML
GEN 5 2HR TROPONIN T REFLEX: <6 NG/L
GLOBULIN UR ELPH-MCNC: 2.6 GM/DL
GLUCOSE SERPL-MCNC: 120 MG/DL (ref 65–99)
HCT VFR BLD AUTO: 48.6 % (ref 37.5–51)
HGB BLD-MCNC: 16.2 G/DL (ref 13–17.7)
IMM GRANULOCYTES # BLD AUTO: 0.03 10*3/MM3 (ref 0–0.05)
IMM GRANULOCYTES NFR BLD AUTO: 0.3 % (ref 0–0.5)
LYMPHOCYTES # BLD AUTO: 1.86 10*3/MM3 (ref 0.7–3.1)
LYMPHOCYTES NFR BLD AUTO: 19.4 % (ref 19.6–45.3)
MAGNESIUM SERPL-MCNC: 1.9 MG/DL (ref 1.6–2.6)
MCH RBC QN AUTO: 28.7 PG (ref 26.6–33)
MCHC RBC AUTO-ENTMCNC: 33.3 G/DL (ref 31.5–35.7)
MCV RBC AUTO: 86 FL (ref 79–97)
METHADONE UR QL SCN: NEGATIVE
MONOCYTES # BLD AUTO: 0.52 10*3/MM3 (ref 0.1–0.9)
MONOCYTES NFR BLD AUTO: 5.4 % (ref 5–12)
NEUTROPHILS NFR BLD AUTO: 7.04 10*3/MM3 (ref 1.7–7)
NEUTROPHILS NFR BLD AUTO: 73.5 % (ref 42.7–76)
NRBC BLD AUTO-RTO: 0 /100 WBC (ref 0–0.2)
OPIATES UR QL: NEGATIVE
OXYCODONE UR QL SCN: NEGATIVE
PCP UR QL SCN: NEGATIVE
PLATELET # BLD AUTO: 247 10*3/MM3 (ref 140–450)
PMV BLD AUTO: 9.8 FL (ref 6–12)
POTASSIUM SERPL-SCNC: 3.5 MMOL/L (ref 3.5–5.2)
PROT SERPL-MCNC: 7.6 G/DL (ref 6–8.5)
RBC # BLD AUTO: 5.65 10*6/MM3 (ref 4.14–5.8)
SODIUM SERPL-SCNC: 141 MMOL/L (ref 136–145)
TRICYCLICS UR QL SCN: NEGATIVE
TROPONIN T DELTA: NORMAL
TROPONIN T SERPL HS-MCNC: <6 NG/L
WBC NRBC COR # BLD: 9.59 10*3/MM3 (ref 3.4–10.8)

## 2023-11-11 PROCEDURE — 93010 ELECTROCARDIOGRAM REPORT: CPT | Performed by: EMERGENCY MEDICINE

## 2023-11-11 PROCEDURE — 36415 COLL VENOUS BLD VENIPUNCTURE: CPT

## 2023-11-11 PROCEDURE — 80307 DRUG TEST PRSMV CHEM ANLYZR: CPT

## 2023-11-11 PROCEDURE — 93005 ELECTROCARDIOGRAM TRACING: CPT | Performed by: EMERGENCY MEDICINE

## 2023-11-11 PROCEDURE — 80053 COMPREHEN METABOLIC PANEL: CPT

## 2023-11-11 PROCEDURE — 85025 COMPLETE CBC W/AUTO DIFF WBC: CPT

## 2023-11-11 PROCEDURE — 84484 ASSAY OF TROPONIN QUANT: CPT

## 2023-11-11 PROCEDURE — 25810000003 SODIUM CHLORIDE 0.9 % SOLUTION

## 2023-11-11 PROCEDURE — 71045 X-RAY EXAM CHEST 1 VIEW: CPT

## 2023-11-11 PROCEDURE — 93005 ELECTROCARDIOGRAM TRACING: CPT

## 2023-11-11 PROCEDURE — 83735 ASSAY OF MAGNESIUM: CPT

## 2023-11-11 PROCEDURE — 99284 EMERGENCY DEPT VISIT MOD MDM: CPT

## 2023-11-11 RX ORDER — SODIUM CHLORIDE 0.9 % (FLUSH) 0.9 %
10 SYRINGE (ML) INJECTION AS NEEDED
Status: DISCONTINUED | OUTPATIENT
Start: 2023-11-11 | End: 2023-11-12 | Stop reason: HOSPADM

## 2023-11-11 RX ORDER — CLONIDINE HYDROCHLORIDE 0.1 MG/1
0.1 TABLET ORAL ONCE
Status: COMPLETED | OUTPATIENT
Start: 2023-11-11 | End: 2023-11-11

## 2023-11-11 RX ADMIN — CLONIDINE HYDROCHLORIDE 0.1 MG: 0.1 TABLET ORAL at 22:15

## 2023-11-11 RX ADMIN — SODIUM CHLORIDE 500 ML: 9 INJECTION, SOLUTION INTRAVENOUS at 20:18

## 2023-11-12 VITALS
BODY MASS INDEX: 25.67 KG/M2 | SYSTOLIC BLOOD PRESSURE: 141 MMHG | RESPIRATION RATE: 20 BRPM | WEIGHT: 200 LBS | OXYGEN SATURATION: 97 % | HEART RATE: 97 BPM | DIASTOLIC BLOOD PRESSURE: 82 MMHG | HEIGHT: 74 IN | TEMPERATURE: 98 F

## 2023-11-12 NOTE — ED PROVIDER NOTES
Subjective   History of Present Illness  Is a 22-year-old male who presents to the ER with left-sided tingling and numbness denies weakness.  He stated this started earlier today.  Patient does have a significant history of SVT and sees Dr. Rodriguez and takes atenolol for this as well.  Patient denies chest pain denies shortness of air at this time.  Denies alcohol or drug use at this time.  Patient states that he feels lightheaded denies a spinning of the room feeling though or dizziness.        Review of Systems   Neurological:         Tingling and numbness in the left side arms and legs.  Lightheadedness   All other systems reviewed and are negative.      Past Medical History:   Diagnosis Date    Bradycardia, drug induced 11/20/2020    Paroxysmal SVT (supraventricular tachycardia) 11/20/2020       No Known Allergies    Past Surgical History:   Procedure Laterality Date    HERNIA REPAIR  2002    TONSILLECTOMY  2004    TYMPANOSTOMY TUBE PLACEMENT  2002       Family History   Problem Relation Age of Onset    Heart disease Other        Social History     Socioeconomic History    Marital status: Single   Tobacco Use    Smoking status: Never    Smokeless tobacco: Never   Vaping Use    Vaping Use: Every day    Substances: Nicotine   Substance and Sexual Activity    Alcohol use: Yes     Alcohol/week: 1.0 standard drink of alcohol     Types: 1 Cans of beer per week     Comment: 1 beer a week    Drug use: Never    Sexual activity: Defer           Objective   Physical Exam  Vitals and nursing note reviewed.   Constitutional:       General: He is not in acute distress.     Appearance: Normal appearance. He is normal weight. He is not toxic-appearing or diaphoretic.   HENT:      Head: Normocephalic and atraumatic.      Right Ear: External ear normal.      Left Ear: External ear normal.      Nose: Nose normal.      Mouth/Throat:      Mouth: Mucous membranes are moist.      Pharynx: Oropharynx is clear.   Eyes:      General:          Right eye: No discharge.         Left eye: No discharge.      Extraocular Movements: Extraocular movements intact.      Conjunctiva/sclera: Conjunctivae normal.      Pupils: Pupils are equal, round, and reactive to light.   Cardiovascular:      Rate and Rhythm: Normal rate and regular rhythm.      Pulses: Normal pulses.      Heart sounds: Normal heart sounds.      Comments: Normal rate, normal rhythm.  Negative for murmur negative for gallop negative for friction rub.  Pulmonary:      Effort: Pulmonary effort is normal. No respiratory distress.      Breath sounds: Normal breath sounds. No rhonchi.      Comments: Lung sounds clear bilaterally  Abdominal:      General: Abdomen is flat.      Tenderness: There is no abdominal tenderness. There is no guarding or rebound.   Musculoskeletal:         General: No deformity or signs of injury. Normal range of motion.      Cervical back: Normal range of motion and neck supple.   Skin:     General: Skin is warm.      Capillary Refill: Capillary refill takes less than 2 seconds.      Coloration: Skin is not jaundiced.   Neurological:      General: No focal deficit present.      Mental Status: He is alert and oriented to person, place, and time. Mental status is at baseline.   Psychiatric:         Mood and Affect: Mood normal.         Behavior: Behavior normal.         Thought Content: Thought content normal.         Judgment: Judgment normal.         Procedures           ED Course                                           Medical Decision Making  History of Present Illness  Is a 22-year-old male who presents to the ER with left-sided tingling and numbness denies weakness.  He stated this started earlier today.  Patient does have a significant history of SVT and sees Dr. Rodriguez and takes atenolol for this as well.  Patient denies chest pain denies shortness of air at this time.  Denies alcohol or drug use at this time.  Patient states that he feels lightheaded denies a spinning of  the room feeling though or dizziness.    Differential diagnosis: Acute cardiac event, electrolyte imbalance, illicit drug use, and others  Imaging and labs were ordered while in the ER.  Tylenol chest x-ray shows no acute process in the chest.  No pleural effusions no pneumothorax no lung consolidations.  The cardiomediastinal silhouette and pulmonary vasculature are within normal limits.  Troponin was less than 6 for the first and second 1 with a negative delta.  CMP was unremarkable.  Magnesium was 1.9 within normal range.  CBC was unremarkable as well urine drug seen was negative for all listed including fentanyl.  Patient was given a 500 mL bolus and a clonidine due to elevated blood pressure.  Patient remained on cardiac monitoring during his time here.  Patient remained in normal sinus and no periods of SVT.  Patient states he still having a lightheaded feeling just but it did off slightly after having clonidine.  I explained due to his significant cardiac history and being on atenolol it would be best if he followed up with Dr. Rodriguez who could maintain his blood pressure on a more routine basis.  Patient gave verbal understanding plans to make an appointment on Monday.  Patient told to follow-up with primary care.  Patient advised to return back to the ER if he has new or worsening symptoms.  Patient gave verbal understanding as well as his mother.    Problems Addressed:  Paresthesia: complicated acute illness or injury  Rapid heart rate: complicated acute illness or injury    Amount and/or Complexity of Data Reviewed  Labs: ordered.  Radiology: ordered.  ECG/medicine tests: ordered.    Risk  Prescription drug management.        Final diagnoses:   Rapid heart rate   Paresthesia       ED Disposition  ED Disposition       ED Disposition   Discharge    Condition   Stable    Comment   --               Wade Valdez MD  [address] [place] [address]  [phone]    Schedule an appointment as soon as  possible for a visit       [facility] EMERGENCY DEPARTMENT  [address]  [facility] [address] [phone]    If symptoms worsen    Murray Rodriguez MD  [address] [id number] [phone]    Schedule an appointment as soon as possible for a visit            Medication List      No changes were made to your prescriptions during this visit.            Nancy Quiñones, APRN  11/12/23 [id number]

## 2023-11-12 NOTE — DISCHARGE INSTRUCTIONS
Today you were seen for tingling and numbness. It does not appear there is anything acute.  You did not follow-up with your cardiologist Dr. Rodriguez.  If you start having new or worsening symptoms return back to the ER for

## 2023-11-13 ENCOUNTER — TELEPHONE (OUTPATIENT)
Dept: CARDIOLOGY | Facility: CLINIC | Age: 23
End: 2023-11-13
Payer: COMMERCIAL

## 2023-11-13 LAB
QT INTERVAL: 376 MS
QT INTERVAL: 384 MS
QTC INTERVAL: 399 MS
QTC INTERVAL: 419 MS

## 2023-11-13 NOTE — TELEPHONE ENCOUNTER
Caller: Herb Kellogg    Relationship to patient: Self    Best call back number: [phone]    New or established patient?  [] New  [x] Established    Date of discharge:     Facility discharged from: [facility] ED    Diagnosis/Symptoms: RAPID HEART RATE AND PARESTHESIA. PATIENT ALSO STATED HE WENT IN TO THE ED WITH BLOOD PRESSURE CONCERNS    Length of stay (If applicable):     Specialty Only: Did you see a [facility] provider?    [] Yes  [x] No  If so, who?

## 2023-11-13 NOTE — TELEPHONE ENCOUNTER
Patients call has been returned and patient has been scheduled with  on 11/17/23. Patient has been advised to call if any symptoms worsen before follow up or to seek sooner medical evaluation, patient voiced understanding.      Thanks  WF

## 2023-11-21 ENCOUNTER — OFFICE VISIT (OUTPATIENT)
Dept: CARDIOLOGY | Facility: CLINIC | Age: 23
End: 2023-11-21
Payer: COMMERCIAL

## 2023-11-21 VITALS
DIASTOLIC BLOOD PRESSURE: 81 MMHG | BODY MASS INDEX: 24.9 KG/M2 | HEART RATE: 69 BPM | WEIGHT: 194 LBS | SYSTOLIC BLOOD PRESSURE: 134 MMHG | OXYGEN SATURATION: 98 % | HEIGHT: 74 IN

## 2023-11-21 DIAGNOSIS — I10 PRIMARY HYPERTENSION: ICD-10-CM

## 2023-11-21 DIAGNOSIS — I47.10 PAROXYSMAL SVT (SUPRAVENTRICULAR TACHYCARDIA): Primary | Chronic | ICD-10-CM

## 2023-11-21 RX ORDER — LOSARTAN POTASSIUM 25 MG/1
25 TABLET ORAL DAILY
Qty: 30 TABLET | Refills: 11 | Status: SHIPPED | OUTPATIENT
Start: 2023-11-21

## 2023-11-21 NOTE — PROGRESS NOTES
Subjective:     Encounter Date: 11/21/2023      Patient ID: Herb Kellogg is a 23 y.o. male with paroxysmal supraventricular tachycardia    Chief Complaint: heart racing  History of Present Illness  Patient presents today for management of SVT. He has been on atenolol since diagnosed at age 4. He had an echo done 4/12/2022 that showed normal LVEF 61-65%, normal LV diastolic function, and no significant valvular disease. Recently patient was in the emergency room on 11/11/2023 with left sided tingling and numbness. When in the ER his BP was elevated and he was given clonidine. Labwork was unremarkable, troponin <6 x 2. CXR was normal as well.   Today he reports that he has felt fine. He reports that he has continued to get some of the tingling and numbness that he went to the ER for. He reports that he hasn't had any heart racing or palpitations. He denies any chest pain. He denies any dizziness. He reports that his tingling occurs more when he lies down; he doesn't notice it as much when he is standing up. He has been dealing with some pain in his back; he did go see the chiropractor once but hasn't noticed any improvement. He denies any leg swelling, orthopnea or PND. He denies any dyspnea on exertion. He follows with Dr Valdez as PCP.     The following portions of the patient's history were reviewed and updated as appropriate: allergies, current medications, past family history, past medical history, past social history, past surgical history and problem list.    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   HENT:  Negative for nosebleeds.    Cardiovascular:  Negative for chest pain, dyspnea on exertion, irregular heartbeat, leg swelling, near-syncope, orthopnea, palpitations, paroxysmal nocturnal dyspnea and syncope.   Respiratory:  Negative for shortness of breath.    Hematologic/Lymphatic: Does not bruise/bleed easily.   Genitourinary:  Negative for hematuria.   Neurological:  Negative for dizziness and weakness.   All other systems reviewed and are negative.         Objective:     Vitals reviewed.   Constitutional:       General: Not in acute distress.     Appearance: Normal appearance. Well-developed.   Eyes:      Pupils: Pupils are equal, round, and reactive to light.   HENT:      Head: Normocephalic and atraumatic.      Nose: Nose normal.   Neck:      Vascular: No carotid bruit.   Pulmonary:      Effort: Pulmonary effort is normal. No respiratory distress.      Breath sounds: Normal breath sounds. No wheezing. No rales.   Cardiovascular:      Normal rate. Regular rhythm.      Murmurs: There is no murmur.   Edema:     Peripheral edema absent.   Abdominal:      General: There is no distension.      Palpations: Abdomen is soft.   Musculoskeletal: Normal range of motion.      Cervical back: Normal range of motion and neck supple. Skin:     General: Skin is warm.      Findings: No erythema or rash.   Neurological:      General: No focal deficit present.      Mental Status: Alert and oriented to person, place, and time.   Psychiatric:         Attention and Perception: Attention normal.     "     Mood and Affect: Mood normal.         Speech: Speech normal.         Behavior: Behavior normal.         Thought Content: Thought content normal.         Judgment: Judgment normal.         /81   Pulse 69   Ht 188 cm (74\")   Wt 88 kg (194 lb)   SpO2 98%   BMI 24.91 kg/m²     Procedures    Lab Review:     Results for orders placed during the hospital encounter of 12/20/22    Adult Stress Echo W/ Cont or Stress Agent if Necessary Per Protocol    Interpretation Summary    Left ventricular ejection fraction appears to be 56 - 60%.    The patient experienced no angina during the stress test.    Low risk stress test for stress induced myocardial ischemia    Hypertensive response to stress with peak /62 mm Hg        All echocardiographic phases visualized which shows increase in contractility wall motion and thickness both globally and regionally suggestive of no obvious evidence of stress-induced ischemia    ____________________________________________________________________  Disclaimer:    Despite low risk stress test for stress induced myocardial ischemia, there is a small but definite fraction of patients who will have significant underlying coronary artery disease that needs further evaluation and definitive treatment.    Missing significant coronary artery disease may result in increased morbidity and mortality.  In view of this if any symptoms such as chest pain, shortness of breath, increasing weakness, feeling dizzy, passing out, palpitations, cold sweats etc.,to seek immediate medical help.    Stress test is intrinsically limited and therefore the results do not confirm or rule out presence of coronary artery disease and need to be interpreted on the basis of presentation and overall clinical picture.  ______________________________________________________________________    Echo 4/12/2022:  Interpretation Summary  Left ventricular ejection fraction appears to be 61 - 65%. Left ventricular " systolic function is normal.  Left ventricular diastolic function was normal.  Abnormal global longitudinal LV strain (GLS) = -15.0%.  Estimated right ventricular systolic pressure from tricuspid regurgitation is normal (<35 mmHg).    I have personally reviewed echo, stress test, ER notes and past office notes prior to patients visit  Assessment:          Diagnosis Plan   1. Paroxysmal SVT (supraventricular tachycardia) (HCC)        2. Primary hypertension                 Plan:       1. Paroxysmal SVT: No known recurrence. Controlled with atenolol. Discussed EP referral with patient to make sure that atenolol is the best management of SVT but patient would prefer to wait at this time. Continue atenolol.     2. Hypertension: Will start Losartan. Discussed with patient that his BP has been on on the higher side of normal for years.     Herb Kellogg  reports that he has never smoked. He has never been exposed to tobacco smoke. He has never used smokeless tobacco.. I have educated him on the risk of diseases from using tobacco products such as cancer, COPD, and heart disease.     Patient is to follow up in 3 months or sooner if needed

## 2024-02-28 ENCOUNTER — OFFICE VISIT (OUTPATIENT)
Dept: CARDIOLOGY | Facility: CLINIC | Age: 24
End: 2024-02-28
Payer: COMMERCIAL

## 2024-02-28 VITALS
WEIGHT: 185 LBS | DIASTOLIC BLOOD PRESSURE: 85 MMHG | HEART RATE: 52 BPM | BODY MASS INDEX: 23.74 KG/M2 | HEIGHT: 74 IN | OXYGEN SATURATION: 98 % | SYSTOLIC BLOOD PRESSURE: 126 MMHG

## 2024-02-28 DIAGNOSIS — R00.1 BRADYCARDIA, DRUG INDUCED: Chronic | ICD-10-CM

## 2024-02-28 DIAGNOSIS — I47.10 PAROXYSMAL SVT (SUPRAVENTRICULAR TACHYCARDIA): Primary | Chronic | ICD-10-CM

## 2024-02-28 DIAGNOSIS — T50.905A BRADYCARDIA, DRUG INDUCED: Chronic | ICD-10-CM

## 2024-02-28 DIAGNOSIS — I10 PRIMARY HYPERTENSION: ICD-10-CM

## 2024-02-28 NOTE — PROGRESS NOTES
"Chief Complaint  Hypertension (3mo F/U. Started Losartan LOV. He never started this. He felt it was not necessary.)    Subjective          Herb Kellogg presents to [facility] for routine follow-up.  He has paroxysmal supraventricular tachycardia diagnosed at age 4 at which time he was started on beta-blocker therapy.  He has drug-induced bradycardia, but denies any symptoms related to this. He reports a one month history of intermittent left sided chest discomfort he describes as a \"squeezing\" type pain. The pain in non-exertional, non-radiating and not associated with any other symptoms. The pain lasts for around 20 minutes at a time and resolves on its own. Patient denies syncope, orthopnea, PND, edema or decreased stamina.  Patient denies any signs of bleeding.    Palpitations   This is a chronic problem. The current episode started more than 1 year ago. The problem occurs rarely. The problem has been unchanged. Associated symptoms include chest pain. Pertinent negatives include no anxiety, chest fullness, coughing, diaphoresis, dizziness, fever, irregular heartbeat, malaise/fatigue, nausea, near-syncope, numbness, shortness of breath, syncope, vomiting or weakness. He has tried beta blockers for the symptoms. The treatment provided significant relief. Risk factors include being male.   Hypertension  This is a recurrent problem. The current episode started more than 1 year ago. The problem has been improved since onset. The problem is controlled. Associated symptoms include chest pain. Pertinent negatives include no anxiety, blurred vision, headaches, malaise/fatigue, neck pain, orthopnea, palpitations, peripheral edema, PND, shortness of breath or sweats. Risk factors for coronary artery disease include male gender. Current antihypertension treatment includes nothing. The current treatment provides moderate improvement.     I have reviewed and confirmed the accuracy of the ROS  " "ELEAZAR Pena      Objective   Vital Signs:   /85   Pulse 52   Ht 188 cm (74\")   Wt 83.9 kg (185 lb)   SpO2 98%   BMI 23.75 kg/m²     Vitals and nursing note reviewed.   Constitutional:       General: Awake.      Appearance: Normal and healthy appearance. Well-developed, normal weight and not in distress.   Eyes:      General: Lids are normal.      Conjunctiva/sclera: Conjunctivae normal.      Pupils: Pupils are equal, round, and reactive to light.   HENT:      Head: Normocephalic and atraumatic.      Nose: Nose normal.   Neck:      Vascular: No JVR. JVD normal.   Pulmonary:      Effort: Pulmonary effort is normal.      Breath sounds: Normal breath sounds. No wheezing. No rhonchi. No rales.   Chest:      Chest wall: Not tender to palpatation.   Cardiovascular:      PMI at left midclavicular line. Bradycardia present. Regular rhythm. Normal S1. Normal S2.       Murmurs: There is no murmur.      No gallop.  No click. No rub.   Pulses:     Intact distal pulses.   Edema:     Peripheral edema absent.   Abdominal:      General: Bowel sounds are normal.      Palpations: Abdomen is soft.      Tenderness: There is no abdominal tenderness.   Musculoskeletal: Normal range of motion.         General: No tenderness.      Cervical back: Normal range of motion. Skin:     General: Skin is warm and dry.   Neurological:      General: No focal deficit present.      Mental Status: Alert, oriented to person, place, and time and oriented to person, place and time.   Psychiatric:         Attention and Perception: Attention and perception normal.         Mood and Affect: Mood and affect normal.         Speech: Speech normal.         Behavior: Behavior normal. Behavior is cooperative.         Thought Content: Thought content normal.         Cognition and Memory: Cognition and memory normal.         Judgment: Judgment normal.        Result Review :   The following data was reviewed by: ELEAZAR Pena on " 02/28/2024:  Common labs          11/11/2023    20:18   Common Labs   Glucose 120    BUN 13    Creatinine 0.84    Sodium 141    Potassium 3.5    Chloride 103    Calcium 9.7    Albumin 5.0    Total Bilirubin 0.9    Alkaline Phosphatase 88    AST (SGOT) 16    ALT (SGPT) 17    WBC 9.59    Hemoglobin 16.2    Hematocrit 48.6    Platelets 247      Data reviewed: Cardiology studies holter monitor 12/11/20           Assessment and Plan    Diagnoses and all orders for this visit:    1. Paroxysmal SVT (supraventricular tachycardia) (HCC) (Primary)-asymptomatic when taking atenolol.  Pt has been evaluated by Dr. Hoyos with EP at which time six month follow up was recommended, however he has not bee seen since that time. Continue atenolol.     2. Bradycardia, drug induced-asymptomatic.  Continue atenolol.    3. Hypertension- blood pressure is mildly elevated in office today. He was previously prescribed losartan, however he states that he never started this medication.  He has not been monitoring his blood pressure at home. Monitor and record daily blood pressure. Report readings consistently higher than 130/80 or consistently lower than 100/60. Advised pt to contact our office in two weeks with blood pressure readings. Will consider re-initiation of losartan as previously prescribed, if BP remains elevated.       Follow Up   Return in about 4 weeks (around 3/27/2024) for Next scheduled follow up.  Patient was given instructions and counseling regarding his condition or for health maintenance advice. Please see specific information pulled into the AVS if appropriate.

## 2024-04-19 RX ORDER — ATENOLOL 25 MG/1
25 TABLET ORAL DAILY
Qty: 90 TABLET | Refills: 3 | Status: SHIPPED | OUTPATIENT
Start: 2024-04-19

## 2025-04-23 RX ORDER — ATENOLOL 25 MG/1
25 TABLET ORAL DAILY
Qty: 30 TABLET | Refills: 0 | Status: SHIPPED | OUTPATIENT
Start: 2025-04-23

## 2025-04-23 NOTE — TELEPHONE ENCOUNTER
"Patient attempts to reach patient for an appointment. No return call from [facility]   Unable LVM  \"PATIENT NEEDS AN APPOINTMENT WITH EP AND CARDIOLOGY FOR MEDICATION REFILLS.\"               to schedule an appointment with EP or Cardiology.  "

## 2025-05-23 RX ORDER — ATENOLOL 25 MG/1
TABLET ORAL
Qty: 30 TABLET | Refills: 0 | OUTPATIENT
Start: 2025-05-23

## 2025-05-23 NOTE — TELEPHONE ENCOUNTER
Caller: Abena Kellogg    Relationship: Mother    Best call back number: [phone]     Requested Prescriptions:   Requested Prescriptions     Pending Prescriptions Disp Refills    atenolol (TENORMIN) 25 MG tablet 30 tablet 0     Sig: Take 1 tablet by mouth Daily.        Pharmacy where request should be sent: [facility] [address] - [phone]  - [phone] FX     Last office visit with prescribing clinician: Visit date not found   Last telemedicine visit with prescribing clinician: Visit date not found   Next office visit with prescribing clinician: 6/9/2025     Additional details provided by patient: PATIENT HAS AN APPOINTMENT WITH DR. JOSHI ON 6.9.25 FOR MED REFILL, PATIENT IS OUT OF MEDICATION.     Does the patient have less than a 3 day supply:  [x] Yes  [] No    Would you like a call back once the refill request has been completed: [x] Yes [] No    If the office needs to give you a call back, can they leave a voicemail: [x] Yes [] No    Lizzy Clark Rep   05/23/25 08:45 CDT

## 2025-05-27 RX ORDER — ATENOLOL 25 MG/1
25 TABLET ORAL DAILY
Qty: 17 TABLET | Refills: 0 | Status: SHIPPED | OUTPATIENT
Start: 2025-05-27

## 2025-06-09 ENCOUNTER — OFFICE VISIT (OUTPATIENT)
Dept: CARDIOLOGY | Facility: CLINIC | Age: 25
End: 2025-06-09
Payer: COMMERCIAL

## 2025-06-09 VITALS
SYSTOLIC BLOOD PRESSURE: 116 MMHG | HEIGHT: 74 IN | BODY MASS INDEX: 26.44 KG/M2 | HEART RATE: 60 BPM | DIASTOLIC BLOOD PRESSURE: 78 MMHG | WEIGHT: 206 LBS

## 2025-06-09 DIAGNOSIS — R00.2 PALPITATIONS: ICD-10-CM

## 2025-06-09 DIAGNOSIS — I10 PRIMARY HYPERTENSION: ICD-10-CM

## 2025-06-09 DIAGNOSIS — R00.1 BRADYCARDIA, DRUG INDUCED: Chronic | ICD-10-CM

## 2025-06-09 DIAGNOSIS — T50.905A BRADYCARDIA, DRUG INDUCED: Chronic | ICD-10-CM

## 2025-06-09 DIAGNOSIS — I47.10 PAROXYSMAL SVT (SUPRAVENTRICULAR TACHYCARDIA): Primary | Chronic | ICD-10-CM

## 2025-06-09 DIAGNOSIS — I10 HYPERTENSIVE RESPONSE TO EXERCISE: ICD-10-CM

## 2025-06-09 PROCEDURE — 99212 OFFICE O/P EST SF 10 MIN: CPT | Performed by: INTERNAL MEDICINE

## 2025-06-09 PROCEDURE — 93000 ELECTROCARDIOGRAM COMPLETE: CPT | Performed by: INTERNAL MEDICINE

## 2025-06-09 RX ORDER — ATENOLOL 25 MG/1
25 TABLET ORAL DAILY
Qty: 90 TABLET | Refills: 6 | Status: SHIPPED | OUTPATIENT
Start: 2025-06-09

## 2025-06-09 NOTE — PROGRESS NOTES
Herb Kellogg  [id number]  2000  24 y.o.  male    Referring Provider: Wade Valdez MD    Reason for  Visit: Here for routine follow up     Subjective    Overall feeling well   No chest pain or shortness of breath     No palpitations  No significant pedal edema    Compliant with medications and dietary advice  Good effort tolerance    No presyncope or syncope  Compliant with medications    Tolerating current medications well with no untoward side effects   Compliant with prescribed medication regimen. Tries to adhere to cardiac diet.      BP well controlled at home.    BP in clinic as below        On beta blocker therapy with excellent results   History of present illness:  Herb Kellogg is a 24 y.o. yo male with PSVT who presents today for   Chief Complaint   Patient presents with    Rapid Heart Rate   .    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    Hypertension Not sure    Paroxysmal SVT (supraventricular tachycardia) 11/20/2020   ,   Past Surgical History:   Procedure Laterality Date    HERNIA REPAIR  2002    TONSILLECTOMY  2004    TYMPANOSTOMY TUBE PLACEMENT  2002   ,   Family History   Problem Relation Age of Onset    Heart disease Other    ,   Social History     Tobacco Use    Smoking status: Never     Passive exposure: Never    Smokeless tobacco: Never   Vaping Use    Vaping status: Former    Substances: Nicotine   Substance Use Topics    Alcohol use: Yes     Alcohol/week: 1.0 standard drink of alcohol     Types: 1 Cans of beer per week     Comment: 1 beer a week    Drug use: Never   ,     Medications  Current Outpatient Medications   Medication Sig Dispense Refill    atenolol (TENORMIN) 25 MG tablet Take 1 tablet by mouth Daily. 90 tablet 6     No current facility-administered medications for this visit.       Allergies:  Patient has no known allergies.    Review of Systems  Review of Systems   Constitutional: Negative.   HENT: Negative.    "  Eyes: Negative.    Cardiovascular:  Negative for chest pain, claudication, cyanosis, dyspnea on exertion, irregular heartbeat, leg swelling, near-syncope, orthopnea, palpitations, paroxysmal nocturnal dyspnea and syncope.   Respiratory: Negative.     Endocrine: Negative.    Hematologic/Lymphatic: Negative.    Skin: Negative.    Gastrointestinal:  Negative for anorexia.   Genitourinary: Negative.    Neurological: Negative.    Psychiatric/Behavioral: Negative.         Objective     Physical Exam:  /78   Pulse 60   Ht 188 cm (74\")   Wt 93.4 kg (206 lb)   BMI 26.45 kg/m²     Physical Exam  Constitutional:       Appearance: He is well-developed.   HENT:      Head: Normocephalic.   Neck:      Vascular: Normal carotid pulses. No carotid bruit or JVD.      Trachea: No tracheal tenderness or tracheal deviation.   Cardiovascular:      Rate and Rhythm: Regular rhythm.      Pulses: Normal pulses.      Heart sounds: Normal heart sounds.   Pulmonary:      Effort: Pulmonary effort is normal.      Breath sounds: No stridor.   Abdominal:      General: There is no distension.      Palpations: Abdomen is soft.      Tenderness: There is no abdominal tenderness.   Musculoskeletal:      Cervical back: No edema.   Skin:     General: Skin is warm.   Neurological:      Mental Status: He is alert.      Cranial Nerves: No cranial nerve deficit.      Sensory: No sensory deficit.   Psychiatric:         Speech: Speech normal.         Behavior: Behavior normal.         Results Review:    Family Cardiac History as of 12/22/2022  Pedigree Problem Relation Age of Onset   Heart disease Other       [facility] PACS Images     Show images for Adult Stress Echo W/ Cont or Stress Agent if Necessary Per Protocol   Interpretation Summary         Left ventricular ejection fraction appears to be 56 - 60%.    The patient experienced no angina during the stress test.    Low risk stress test for stress induced myocardial ischemia    Hypertensive response " to stress with peak /62 mm Hg       Results for orders placed during the hospital encounter of 04/12/22    Adult Transthoracic Echo Complete w/ Color, Spectral and Contrast if necessary per protocol    Interpretation Summary  · Left ventricular ejection fraction appears to be 61 - 65%. Left ventricular systolic function is normal.  · Left ventricular diastolic function was normal.  · Abnormal global longitudinal LV strain (GLS) = -15.0%.  · Estimated right ventricular systolic pressure from tricuspid regurgitation is normal (<35 mmHg).      ____________________________________________________________________________________________________________________________________________  Health maintenance and recommendations    Low salt/ HTN/ Heart healthy carbohydrate restricted cardiac diet   The patient is advised to reduce or avoid caffeine or other cardiac stimulants.   Minimize or avoid  NSAID-type medications      Monitor for any signs of bleeding including red or dark stools. Fall precautions.   Advised staying uptodate with immunizations per established standard guidelines.    Offered to give patient  a copy of my notes     Questions were encouraged, asked and answered to the patient's  understanding and satisfaction. Questions if any regarding current medications and side effects, need for refills and importance of compliance to medications stressed.    Reviewed available prior notes, consults, prior visits, laboratory findings, radiology and cardiology relevant reports. Updated chart as applicable. I have reviewed the patient's medical history in detail and updated the computerized patient record as relevant.      Updated patient regarding any new or relevant abnormalities on review of records or any new findings on physical exam. Mentioned to patient about purpose of visit and desirable health short and long term goals and objectives.    Primary to monitor CBC CMP Lipid panel and TSH as  applicable    ___________________________________________________________________________________________________________________________________________     ECG 12 Lead    Date/Time: 6/9/2025 9:09 AM  Performed by: Murray Rodriguez MD    Authorized by: Murray Rodriguez MD  Comparison: compared with previous ECG from 11/11/2023  Comparison to previous ECG: Ventricular rate changed from 65  to 53   beats per minute      Rhythm: sinus bradycardia  Rate: bradycardic  Conduction: conduction normal  ST Segments: ST segments normal  T Waves: T waves normal  QRS axis: normal  Other: no other findings    Clinical impression: normal ECG          Assessment & Plan   Diagnoses and all orders for this visit:    1. Paroxysmal SVT (supraventricular tachycardia) (Primary)    2. Palpitations    3. Primary hypertension    4. Hypertensive response to exercise    5. Bradycardia, drug induced    Other orders  -     atenolol (TENORMIN) 25 MG tablet; Take 1 tablet by mouth Daily.  Dispense: 90 tablet; Refill: 6  -     ECG 12 Lead          Plan      Patient expressed understanding  Encouraged and answered all questions   Discussed with the patient and all questioned fully answered. He will call me if any problems arise.   Discussed results of prior testing with patient : echo and stress echo   as well as electrocardiogram available for review     Overall doing well no new cardiovascular symptoms and therefore no additional cardiac testing is required prior to next visit  If any interim issues arise will call me for further evaluation.     Continue beta blocker therapy   Requested Prescriptions     Signed Prescriptions Disp Refills    atenolol (TENORMIN) 25 MG tablet 90 tablet 6     Sig: Take 1 tablet by mouth Daily.        Check BP and heart rates twice daily initially till blood pressures and heart rates under good control and then at least 3x / week,   If blood pressures continue to be well-controlled then can check week a month  at home and  bring a recording for review next visit  If BP >130/85 or < 100/60 persistently over 3 reading 30 mins apart or if heart rates persistently above 100 bpm or less than 55 bpm call sooner for evaluation and advise           Return in about 23 months (around 5/5/2027).